# Patient Record
Sex: MALE | Race: WHITE | NOT HISPANIC OR LATINO | Employment: OTHER | URBAN - METROPOLITAN AREA
[De-identification: names, ages, dates, MRNs, and addresses within clinical notes are randomized per-mention and may not be internally consistent; named-entity substitution may affect disease eponyms.]

---

## 2020-10-13 PROBLEM — I10 HYPERTENSION: Status: ACTIVE | Noted: 2020-10-13

## 2021-04-03 ENCOUNTER — HOSPITAL ENCOUNTER (EMERGENCY)
Facility: HOSPITAL | Age: 36
Discharge: HOME | End: 2021-04-03
Admitting: EMERGENCY MEDICINE

## 2021-04-03 ENCOUNTER — APPOINTMENT (OUTPATIENT)
Dept: GENERAL RADIOLOGY | Facility: HOSPITAL | Age: 36
End: 2021-04-03

## 2021-04-03 VITALS
WEIGHT: 206.57 LBS | TEMPERATURE: 97.7 F | OXYGEN SATURATION: 99 % | SYSTOLIC BLOOD PRESSURE: 132 MMHG | RESPIRATION RATE: 16 BRPM | HEART RATE: 98 BPM | HEIGHT: 74 IN | DIASTOLIC BLOOD PRESSURE: 89 MMHG | BODY MASS INDEX: 26.51 KG/M2

## 2021-04-03 DIAGNOSIS — S61.210A LACERATION OF RIGHT INDEX FINGER WITHOUT DAMAGE TO NAIL, FOREIGN BODY PRESENCE UNSPECIFIED, INITIAL ENCOUNTER: ICD-10-CM

## 2021-04-03 DIAGNOSIS — M79.644 FINGER PAIN, RIGHT: Primary | ICD-10-CM

## 2021-04-03 PROCEDURE — 90715 TDAP VACCINE 7 YRS/> IM: CPT | Performed by: NURSE PRACTITIONER

## 2021-04-03 PROCEDURE — 99282 EMERGENCY DEPT VISIT SF MDM: CPT

## 2021-04-03 PROCEDURE — 25010000002 TDAP 5-2.5-18.5 LF-MCG/0.5 SUSPENSION: Performed by: NURSE PRACTITIONER

## 2021-04-03 PROCEDURE — 73140 X-RAY EXAM OF FINGER(S): CPT

## 2021-04-03 PROCEDURE — 90471 IMMUNIZATION ADMIN: CPT | Performed by: NURSE PRACTITIONER

## 2021-04-03 RX ORDER — CEPHALEXIN 500 MG/1
500 CAPSULE ORAL 3 TIMES DAILY
Qty: 15 CAPSULE | Refills: 0 | Status: SHIPPED | OUTPATIENT
Start: 2021-04-03 | End: 2021-04-08

## 2021-04-03 RX ADMIN — TETANUS TOXOID, REDUCED DIPHTHERIA TOXOID AND ACELLULAR PERTUSSIS VACCINE, ADSORBED 0.5 ML: 5; 2.5; 8; 8; 2.5 SUSPENSION INTRAMUSCULAR at 18:56

## 2021-04-03 NOTE — ED NOTES
Patient presents to ED with reports that approx 45 minutes ago he smashed his right index finger between two 450 pound stones. His index finger has noted trauma and skin folded away.      Emelia Shipman RN  04/03/21 2849

## 2021-04-03 NOTE — DISCHARGE INSTRUCTIONS
Wash wound with mild soap and water twice a day.  If visibly soiled wash additional time.  Watch for any redness purulent drainage warmth to index finger.  May take Tylenol for pain.  Take full course of antibiotics.  Call [facility] tomorrow for follow-up.  May follow-up with hand specialist or primary care provider for suture removal in 12 to 14 days.

## 2021-04-03 NOTE — ED PROVIDER NOTES
Subjective   History: Patient is a 36-year-old male complains of right index finger pain for approximately 45 minutes.  Patient reports he was laying stone for a sidewalk when he showed a crowbar under the stone and then it slipped crushing his distal portion of his right index finger under the stone.  States initially he was stuck for a little bit because no one is around to help him get the stone off.  Did not clean wound to distal portion of finger as he states it hurt too much he came directly to ER.  Did not take any medication for pain.  Tetanus not up-to-date      Onset: 45 minutes  Location: Right index finger  Duration: Constant  Character: Throbbing  Aggravating/Alleviating factors: Any movement makes it worse  Radiation nonradiating  Severity: Severe            Review of Systems   Constitutional: Negative for chills, fatigue and fever.   HENT: Negative for congestion, sore throat, tinnitus and trouble swallowing.    Eyes: Negative for photophobia, discharge and visual disturbance.   Respiratory: Negative for cough and shortness of breath.    Cardiovascular: Negative for chest pain.   Gastrointestinal: Negative for abdominal pain, diarrhea, nausea and vomiting.   Genitourinary: Negative for dysuria, frequency and urgency.   Musculoskeletal: Positive for myalgias. Negative for back pain.   Skin: Positive for wound. Negative for rash.   Neurological: Negative for dizziness and headaches.   Psychiatric/Behavioral: Negative for confusion.       History reviewed. No pertinent past medical history.    No Known Allergies    History reviewed. No pertinent surgical history.    History reviewed. No pertinent family history.    Social History     Socioeconomic History   • Marital status:      Spouse name: Not on file   • Number of children: Not on file   • Years of education: Not on file   • Highest education level: Not on file           Objective   Physical Exam  Vitals reviewed.   Constitutional:        General: He is in acute distress.      Comments: Due to finger pain   HENT:      Head: Normocephalic.   Eyes:      Pupils: Pupils are equal, round, and reactive to light.   Cardiovascular:      Rate and Rhythm: Tachycardia present.      Pulses: Normal pulses.      Heart sounds: Normal heart sounds. No murmur heard.     Pulmonary:      Effort: Pulmonary effort is normal.   Musculoskeletal:         General: Tenderness and signs of injury present. Normal range of motion.      Cervical back: Normal range of motion.      Comments: Right index finger distal phalanx tender on palpation, no deformity, large U-shaped laceration to distal portion right index finger, bleeding controlled with pressure.  Decreased sensation to distal tip.  Hand is visibly soiled unable to detect color of flesh.  Flexion and extension of DIP appropriate and intact.   Skin:     General: Skin is warm and dry.   Neurological:      Mental Status: He is alert and oriented to person, place, and time.   Psychiatric:         Mood and Affect: Mood normal.         Behavior: Behavior normal.         Thought Content: Thought content normal.         Judgment: Judgment normal.         Laceration Repair    Date/Time: 4/3/2021 8:02 PM  Performed by: Nicole Tomlin APRN  Authorized by: Nicole Tomlin APRN     Consent:     Consent obtained:  Verbal    Consent given by:  Patient    Risks discussed:  Pain, infection and poor cosmetic result    Alternatives discussed:  No treatment  Anesthesia (see MAR for exact dosages):     Anesthesia method:  Nerve block    Block location:  Palmar aspect at base of right index finger at MCP    Block needle gauge:  25 G    Block anesthetic:  Lidocaine 1% w/o epi    Block injection procedure:  Anatomic landmarks identified, introduced needle and negative aspiration for blood    Block outcome:  Anesthesia achieved  Laceration details:     Location:  Finger    Finger location:  R index finger    Length (cm):  5 (U-shaped, palmar  "side distal phalanx)  Repair type:     Repair type:  Simple  Pre-procedure details:     Preparation:  Patient was prepped and draped in usual sterile fashion  Exploration:     Hemostasis achieved with:  Direct pressure    Wound exploration: wound explored through full range of motion      Wound extent comment:  Unable to visualize any foreign bodies.    Contaminated: yes    Skin repair:     Repair method:  Sutures    Suture size:  5-0    Suture material:  Nylon    Suture technique:  Simple interrupted    Number of sutures:  12  Approximation:     Approximation:  Close  Post-procedure details:     Dressing:  Antibiotic ointment and non-adherent dressing    Patient tolerance of procedure:  Tolerated well, no immediate complications               ED Course    BP (!) 145/109 (BP Location: Left arm, Patient Position: Sitting)   Pulse 112   Temp 97.7 °F (36.5 °C) (Oral)   Resp 22   Ht 188 cm (74\")   Wt 93.7 kg (206 lb 9.1 oz)   SpO2 99%   BMI 26.52 kg/m²   Labs Reviewed - No data to display  Medications   Tdap (BOOSTRIX) injection 0.5 mL (0.5 mL Intramuscular Given 4/3/21 1856)     XR Finger 2+ View Right    Result Date: 4/3/2021   1. Deep soft tissue laceration extending to the bone at the DIP joint. There are tiny punctate foreign bodies within the soft tissues. 2. No acute fracture or dislocation.  Electronically Signed By-Fabricio Encarnacion MD On:4/3/2021 7:20 PM This report was finalized on [id number] by  Fabricio Encarnacion MD.      ED Course as of Apr 03 2002   Sat Apr 03, 2021   1845 Digital block completed right index finger on initial arrival for pain control.  Tolerated well with good anesthesia post injection.    [KJ]      ED Course User Index  [KJ] Nicole Tomlin, APRN                                           MDM     I examined the patient using the appropriate personal protective equipment.      DISPOSITION:   Chart Review:  Comorbidity:  has no past medical history on file.  Differentials:this list is not " all inclusive and does not constitute the entirety of considered causes --> fracture dislocation foreign body  ECG: interpreted by ER physician and reviewed by myself:   Labs: Not applicable    Imaging: Was interpreted by physician and reviewed by myself:  XR Finger 2+ View Right    Result Date: 4/3/2021   1. Deep soft tissue laceration extending to the bone at the DIP joint. There are tiny punctate foreign bodies within the soft tissues. 2. No acute fracture or dislocation.  Electronically Signed By-Fabricio Encarnacion MD On:4/3/2021 7:20 PM This report was finalized on [id number] by  Fabricio nEcarnacion MD.      Disposition/Treatment:    Patient initially had digital block on arrival for pain control.  Block did wear off prior to me being able to suture the laceration.  X-ray right index finger negative for any fracture dislocation did show tiny punctate foreign bodies within soft tissues.  Patient was cleaned by ER tech with chlorhexidine scrub brush and saline prior to x-ray.  I irrigated wound additional time with 250 mL saline and peribottle with forceful irrigation post x-ray.  Patient had good flexion extension of DIP point.  I discussed results with patient stating high risk of infection and to make sure he took full course of antibiotics and watch for any symptoms of infection reviewing signs and symptoms with patient.  Additional digital block completed prior to suture repair.  Patient tolerated well.  See procedure note.  Discussed wound care suture removal and follow-up with [facility].  Patient agreeable to plan of care.  Able for discharge.    Prescription: Keflex    Final diagnoses:   Finger pain, right   Laceration of right index finger without damage to nail, foreign body presence unspecified, initial encounter       ED Disposition  ED Disposition     ED Disposition Condition Comment    Discharge Stable           [facility] [place]  [address] [place] [address] [address]  [facility]  [address]  [phone]  Schedule an appointment as soon as possible for a visit            Medication List      New Prescriptions    cephalexin 500 MG capsule  Commonly known as: KEFLEX  Take 1 capsule by mouth 3 (Three) Times a Day for 5 days.           Where to Get Your Medications      You can get these medications from any pharmacy    Bring a paper prescription for each of these medications  · cephalexin 500 MG capsule          Nicole Tomlin APRN  04/03/21 2004       Nicole Tomlin APRN  04/03/21 2021

## 2021-04-05 ENCOUNTER — EPISODE CHANGES (OUTPATIENT)
Dept: CASE MANAGEMENT | Facility: OTHER | Age: 36
End: 2021-04-05